# Patient Record
Sex: FEMALE | Race: WHITE | NOT HISPANIC OR LATINO | Employment: OTHER | URBAN - METROPOLITAN AREA
[De-identification: names, ages, dates, MRNs, and addresses within clinical notes are randomized per-mention and may not be internally consistent; named-entity substitution may affect disease eponyms.]

---

## 2017-01-30 ENCOUNTER — HOSPITAL ENCOUNTER (OUTPATIENT)
Dept: RADIOLOGY | Facility: CLINIC | Age: 62
Discharge: HOME | End: 2017-01-30
Admitting: EMERGENCY MEDICINE
Payer: COMMERCIAL

## 2017-01-30 ENCOUNTER — OFFICE VISIT (OUTPATIENT)
Dept: URGENT CARE | Facility: CLINIC | Age: 62
End: 2017-01-30
Payer: COMMERCIAL

## 2017-01-30 DIAGNOSIS — S93.601A SPRAIN OF RIGHT FOOT: ICD-10-CM

## 2017-01-30 PROCEDURE — 29515 APPLICATION SHORT LEG SPLINT: CPT

## 2017-01-30 PROCEDURE — 73590 X-RAY EXAM OF LOWER LEG: CPT

## 2017-01-30 PROCEDURE — 99214 OFFICE O/P EST MOD 30 MIN: CPT

## 2017-01-30 PROCEDURE — 73630 X-RAY EXAM OF FOOT: CPT

## 2018-07-20 ENCOUNTER — TRANSCRIBE ORDERS (OUTPATIENT)
Dept: ADMINISTRATIVE | Facility: HOSPITAL | Age: 63
End: 2018-07-20

## 2018-07-20 DIAGNOSIS — Z12.39 SCREENING BREAST EXAMINATION: Primary | ICD-10-CM

## 2018-08-03 ENCOUNTER — HOSPITAL ENCOUNTER (OUTPATIENT)
Dept: MAMMOGRAPHY | Facility: HOSPITAL | Age: 63
Discharge: HOME | End: 2018-08-03
Attending: FAMILY MEDICINE
Payer: COMMERCIAL

## 2018-08-03 DIAGNOSIS — Z12.39 SCREENING BREAST EXAMINATION: ICD-10-CM

## 2018-08-03 PROCEDURE — 77063 BREAST TOMOSYNTHESIS BI: CPT

## 2018-08-03 PROCEDURE — 77067 SCR MAMMO BI INCL CAD: CPT

## 2019-05-15 ENCOUNTER — TRANSCRIBE ORDERS (OUTPATIENT)
Dept: LAB | Facility: MEDICAL CENTER | Age: 64
End: 2019-05-15

## 2019-05-15 ENCOUNTER — APPOINTMENT (OUTPATIENT)
Dept: LAB | Facility: MEDICAL CENTER | Age: 64
End: 2019-05-15
Payer: MEDICARE

## 2019-05-15 DIAGNOSIS — E55.9 VITAMIN D DEFICIENCY: ICD-10-CM

## 2019-05-15 DIAGNOSIS — E74.39 GLUCOSE INTOLERANCE: ICD-10-CM

## 2019-05-15 DIAGNOSIS — M89.9 BONE DISEASE: ICD-10-CM

## 2019-05-15 DIAGNOSIS — I10 HYPERTENSION, UNSPECIFIED TYPE: Primary | ICD-10-CM

## 2019-05-15 DIAGNOSIS — I10 HYPERTENSION, UNSPECIFIED TYPE: ICD-10-CM

## 2019-05-15 LAB
25(OH)D3 SERPL-MCNC: 30 NG/ML (ref 30–100)
EST. AVERAGE GLUCOSE BLD GHB EST-MCNC: 114 MG/DL
HBA1C MFR BLD: 5.6 % (ref 4.2–6.3)
T4 FREE SERPL-MCNC: 1.09 NG/DL (ref 0.76–1.46)
TSH SERPL DL<=0.05 MIU/L-ACNC: 1.61 UIU/ML (ref 0.36–3.74)

## 2019-05-15 PROCEDURE — 84439 ASSAY OF FREE THYROXINE: CPT

## 2019-05-15 PROCEDURE — 84443 ASSAY THYROID STIM HORMONE: CPT

## 2019-05-15 PROCEDURE — 82306 VITAMIN D 25 HYDROXY: CPT

## 2019-05-15 PROCEDURE — 83036 HEMOGLOBIN GLYCOSYLATED A1C: CPT

## 2019-05-15 PROCEDURE — 36415 COLL VENOUS BLD VENIPUNCTURE: CPT

## 2019-08-13 ENCOUNTER — TRANSCRIBE ORDERS (OUTPATIENT)
Dept: ADMINISTRATIVE | Facility: HOSPITAL | Age: 64
End: 2019-08-13

## 2019-08-13 DIAGNOSIS — Z12.39 BREAST SCREENING, UNSPECIFIED: Primary | ICD-10-CM

## 2019-08-14 ENCOUNTER — TRANSCRIBE ORDERS (OUTPATIENT)
Dept: ADMINISTRATIVE | Facility: HOSPITAL | Age: 64
End: 2019-08-14

## 2019-08-14 DIAGNOSIS — Z12.31 ENCOUNTER FOR SCREENING MAMMOGRAM FOR MALIGNANT NEOPLASM OF BREAST: Primary | ICD-10-CM

## 2019-08-15 ENCOUNTER — HOSPITAL ENCOUNTER (OUTPATIENT)
Dept: MAMMOGRAPHY | Facility: HOSPITAL | Age: 64
Discharge: HOME | End: 2019-08-15
Attending: INTERNAL MEDICINE
Payer: MEDICARE

## 2019-08-15 VITALS — BODY MASS INDEX: 26.29 KG/M2 | WEIGHT: 154 LBS | HEIGHT: 64 IN

## 2019-08-15 DIAGNOSIS — Z12.31 ENCOUNTER FOR SCREENING MAMMOGRAM FOR MALIGNANT NEOPLASM OF BREAST: ICD-10-CM

## 2019-08-15 PROCEDURE — 77067 SCR MAMMO BI INCL CAD: CPT

## 2019-08-15 PROCEDURE — 77063 BREAST TOMOSYNTHESIS BI: CPT

## 2020-07-27 ENCOUNTER — OFFICE VISIT (OUTPATIENT)
Dept: URGENT CARE | Facility: CLINIC | Age: 65
End: 2020-07-27
Payer: MEDICARE

## 2020-07-27 ENCOUNTER — APPOINTMENT (OUTPATIENT)
Dept: RADIOLOGY | Facility: CLINIC | Age: 65
End: 2020-07-27
Payer: MEDICARE

## 2020-07-27 VITALS
RESPIRATION RATE: 16 BRPM | TEMPERATURE: 97.8 F | HEART RATE: 83 BPM | WEIGHT: 155.2 LBS | OXYGEN SATURATION: 98 % | HEIGHT: 63 IN | DIASTOLIC BLOOD PRESSURE: 95 MMHG | BODY MASS INDEX: 27.5 KG/M2 | SYSTOLIC BLOOD PRESSURE: 224 MMHG

## 2020-07-27 DIAGNOSIS — S89.91XA INJURY OF RIGHT KNEE, INITIAL ENCOUNTER: ICD-10-CM

## 2020-07-27 DIAGNOSIS — S89.92XA INJURY OF LEFT KNEE, INITIAL ENCOUNTER: ICD-10-CM

## 2020-07-27 DIAGNOSIS — S80.01XA CONTUSION OF RIGHT KNEE, INITIAL ENCOUNTER: ICD-10-CM

## 2020-07-27 DIAGNOSIS — S80.211A ABRASION OF RIGHT KNEE, INITIAL ENCOUNTER: Primary | ICD-10-CM

## 2020-07-27 DIAGNOSIS — S80.212A ABRASION, LEFT KNEE, INITIAL ENCOUNTER: ICD-10-CM

## 2020-07-27 PROCEDURE — G0463 HOSPITAL OUTPT CLINIC VISIT: HCPCS | Performed by: PHYSICIAN ASSISTANT

## 2020-07-27 PROCEDURE — 73564 X-RAY EXAM KNEE 4 OR MORE: CPT

## 2020-07-27 PROCEDURE — 90715 TDAP VACCINE 7 YRS/> IM: CPT

## 2020-07-27 PROCEDURE — 99213 OFFICE O/P EST LOW 20 MIN: CPT | Performed by: PHYSICIAN ASSISTANT

## 2020-07-27 PROCEDURE — 90471 IMMUNIZATION ADMIN: CPT | Performed by: PHYSICIAN ASSISTANT

## 2020-07-27 RX ORDER — RAMIPRIL 2.5 MG/1
5 CAPSULE ORAL DAILY
COMMUNITY

## 2020-07-27 RX ORDER — MULTIVITAMIN
1 TABLET ORAL DAILY
COMMUNITY

## 2020-07-27 NOTE — PROGRESS NOTES
[address]        NAME: Luiz Pantoja is a 76 y o  female  : 1955    MRN: [id number]  DATE: 2020  TIME: 3:02 PM    Assessment and Plan   Abrasion of right knee, initial encounter [S80 211A]  1  Abrasion of right knee, initial encounter  TDAP Vaccine greater than or equal to 6yo   2  Injury of right knee, initial encounter  XR knee 4+ vw right injury   3  Contusion of right knee, initial encounter     4  Abrasion, left knee, initial encounter     5  Injury of left knee, initial encounter  XR knee 4+ vw left injury         Patient Instructions     Apply Neosporin or bacitracin to the abrasions  Apply ice 20 minutes at a time every 2 hours to help with swelling  Taken anti-inflammatory of your choice but take food  If no improvement follow-up your family doctor or Orthopedics  Follow up with PCP in 3-5 days  Proceed to  ER if symptoms worsen  Chief Complaint     Chief Complaint   Patient presents with    Fall     pt fell today after tripping, now c/o BL knee pain, right worse than left  Last tetanus was 2010  History of Present Illness       Patient launch due to protect her grandson from falling, when she tripped and fell herself injuring both knees  Patient has abrasions of both knees right being worse than left she is having swelling and pain bilaterally right being worse than  Review of Systems   Review of Systems   Constitutional: Negative for chills and fever  Gastrointestinal: Negative for nausea and vomiting  Musculoskeletal: Positive for arthralgias  Skin: Positive for wound  Neurological: Negative for weakness and numbness  Hematological: Negative for adenopathy           Current Medications       Current Outpatient Medications:     Cholecalciferol (VITAMIN D3 PO), Take by mouth, Disp: , Rfl:     Multiple Vitamin (MULTIVITAMIN) tablet, Take 1 tablet by mouth daily, Disp: , Rfl:     ramipril (ALTACE) 2 5 mg capsule, Take 5 mg by mouth daily, Disp: , Rfl:     Current Allergies     Allergies as of 07/27/2020 - Reviewed 07/27/2020   Allergen Reaction Noted    Penicillins Nausea Only 08/15/2019    Sulfa antibiotics Nausea Only 08/15/2019            The following portions of the patient's history were reviewed and updated as appropriate: allergies, current medications, past family history, past medical history, past social history, past surgical history and problem list      History reviewed  No pertinent past medical history  Past Surgical History:   Procedure Laterality Date    HYSTERECTOMY  2002       Family History   Problem Relation Age of Onset    Diabetes Mother     Hypertension Father     No Known Problems Sister     No Known Problems Maternal Grandmother     No Known Problems Paternal Grandmother     No Known Problems Sister     No Known Problems Sister     No Known Problems Maternal Aunt     No Known Problems Maternal Aunt          Medications have been verified  Objective   BP (!) 224/95 (BP Location: Left arm, Patient Position: Sitting, Cuff Size: Large)   Pulse 83   Temp 97 8 °F (36 6 °C) (Temporal)   Resp 16   Ht 5' 3" (1 6 m)   Wt 70 4 kg (155 lb 3 2 oz)   SpO2 98%   BMI 27 49 kg/m²        Physical Exam     Physical Exam   Constitutional: She appears well-developed and well-nourished  HENT:   Head: Normocephalic and atraumatic  Cardiovascular: Normal rate and regular rhythm  Pulmonary/Chest: Effort normal    Musculoskeletal:   Abrasion over the right patellar tendon  There is swelling over the patellar tendon  No limitation of motion  There is tenderness to palpation of the right knee  Left knee with a small abrasion no swelling  Mild tenderness to palpation  No limitation of motion  Neurological: She is alert  Skin: Skin is warm  Psychiatric: She has a normal mood and affect  Her behavior is normal    Nursing note and vitals reviewed      Knee x-ray viewed by me and independently reviewed by me contemporaneously as no acute bony abnormality

## 2020-07-27 NOTE — PATIENT INSTRUCTIONS
Apply Neosporin or bacitracin to the abrasions  Apply ice 20 minutes at a time every 2 hours to help with swelling  Taken anti-inflammatory of your choice but take food  If no improvement follow-up your family doctor or Orthopedics  Contusion in Adults, Ambulatory Care   GENERAL INFORMATION:   A contusion  is a bruise that appears on your skin after an injury  A bruise happens when small blood vessels tear but skin does not  When blood vessels tear, blood leaks into nearby tissue, such as soft tissue or muscle  Common symptoms include the following:   · Pain that increases when you touch the bruise, walk, or use the area around the bruise    · Swelling or a lump at the site of the bruise or near it    · Red, blue, or black skin that may change to green or yellow after a few days    · Stiffness or problems moving the bruised area of your body  Seek immediate care for the following symptoms:   · New difficulty moving your injured area    · Tingling or numbness in or near the injured area    · Hand or foot below the bruise gets cold or turns pale  Treatment for a contusion  may include any of the following:  · NSAIDs  help decrease swelling and pain or fever  This medicine is available with or without a doctor's order  NSAIDs can cause stomach bleeding or kidney problems in certain people  If you take blood thinner medicine, always ask your healthcare provider if NSAIDs are safe for you  Always read the medicine label and follow directions  · Pain medicine  to decrease or take away pain  Do not wait until the pain is severe before you take your medicine  · Aspiration  to drain pooled blood in your muscle may be done to help prevent increased pressure in the muscle  · Surgery  may be done to repair a tear in the muscle or relieve pressure in the muscle caused by swelling    Care for a contusion:   · Rest the injured area  or use it less than usual  If you bruised your leg or foot, you may need crutches or a cane to help you walk  This will help you keep weight off your injured body part  Use crutches or a cane as directed  · Use ice  to decrease swelling and pain  Ice may also help prevent tissue damage  Use an ice pack, or put crushed ice in a plastic bag  Cover it with a towel and place it on your bruise for 15 to 20 minutes every hour or as directed  · Use Compression  An elastic bandage may be wrapped around a bruised muscle to support the area and decrease swelling  Make sure the bandage is not too tight  You should be able to fit 1 finger between the bandage and your skin  · Elevate (raise) your injured body part  above the level of your heart to help decrease pain and swelling  Use pillows, blankets, or rolled towels to elevate the area as often as you can  · Do not massage or use heat  Heat and massage may slow healing of the area  · Do not drink alcohol  Alcohol may slow healing of your injury  · Do not stretch injured muscles  Ask your healthcare provider when and how you may safely stretch after your injury  Prevent a contusion:   · Stretch and warm up before you play sports or exercise  · Wear protective gear when you play sports  Examples are shin guards and padding  · If you begin a new physical activity, start slowly to give your body a chance to adjust   Follow up with your healthcare provider as directed:  Write down your questions so you remember to ask them during your visits  CARE AGREEMENT:   You have the right to help plan your care  Learn about your health condition and how it may be treated  Discuss treatment options with your caregivers to decide what care you want to receive  You always have the right to refuse treatment  The above information is an  only  It is not intended as medical advice for individual conditions or treatments   Talk to your doctor, nurse or pharmacist before following any medical regimen to see if it is safe and effective for you  © 2014 [address] is for End User's use only and may not be sold, redistributed or otherwise used for commercial purposes  All illustrations and images included in CareNotes® are the copyrighted property of [employer]  or [employer]ai [employer]  Abrasion   AMBULATORY CARE:   An abrasion  is a scrape on your skin  It happens when your skin rubs against a rough surface  Some examples of an abrasion include rug burn, a skinned elbow, or road rash  Abrasions can be many shapes and sizes  The wound may hurt, bleed, bruise, or swell  Seek care immediately if:   · The bleeding does not stop after 10 minutes of firm pressure  · You cannot rinse one or more foreign objects out of your wound  · You have red streaks on your skin coming from your wound  Contact your healthcare provider if:   · You have a fever or chills  · Your abrasion is red, warm, swollen, or draining pus  · You have questions or concerns about your condition or care  Care for your abrasion:   · Wash your hands and dry them with a clean towel  · Press a clean cloth against your wound to stop any bleeding  · Rinse your wound with a lot of clean water  Do not use harsh soap, alcohol, or iodine solutions  · Use a clean, wet cloth to remove any objects, such as small pieces of rocks or dirt  · Rub antibiotic ointment on your wound  This may help prevent infection and help your wound heal     · Cover the wound with a non-stick bandage  Change the bandage daily, and if gets wet or dirty  Follow up with your healthcare provider as directed:  Write down your questions so you remember to ask them during your visits  © 2017 [address] Information is for End User's use only and may not be sold, redistributed or otherwise used for commercial purposes   All illustrations and images included in CareNotes® are the copyrighted property of [employer]  or [employer]  The above information is an  only  It is not intended as medical advice for individual conditions or treatments  Talk to your doctor, nurse or pharmacist before following any medical regimen to see if it is safe and effective for you

## 2020-08-03 ENCOUNTER — TRANSCRIBE ORDERS (OUTPATIENT)
Dept: ADMINISTRATIVE | Facility: HOSPITAL | Age: 65
End: 2020-08-03

## 2020-08-03 DIAGNOSIS — Z12.31 ENCOUNTER FOR SCREENING MAMMOGRAM FOR MALIGNANT NEOPLASM OF BREAST: Primary | ICD-10-CM

## 2020-08-17 ENCOUNTER — HOSPITAL ENCOUNTER (OUTPATIENT)
Dept: MAMMOGRAPHY | Facility: HOSPITAL | Age: 65
Discharge: HOME | End: 2020-08-17
Attending: FAMILY MEDICINE
Payer: MEDICARE

## 2020-08-17 VITALS — HEIGHT: 63 IN | BODY MASS INDEX: 27.29 KG/M2 | WEIGHT: 154 LBS

## 2020-08-17 DIAGNOSIS — Z12.31 ENCOUNTER FOR SCREENING MAMMOGRAM FOR MALIGNANT NEOPLASM OF BREAST: ICD-10-CM

## 2020-08-17 PROCEDURE — 77067 SCR MAMMO BI INCL CAD: CPT

## 2020-08-17 PROCEDURE — 77063 BREAST TOMOSYNTHESIS BI: CPT

## 2021-05-22 ENCOUNTER — HOSPITAL ENCOUNTER (EMERGENCY)
Dept: HOSPITAL 53 - M ED | Age: 66
LOS: 1 days | Discharge: HOME | End: 2021-05-23
Payer: MEDICARE

## 2021-05-22 VITALS — HEIGHT: 63 IN | WEIGHT: 159.39 LBS | BODY MASS INDEX: 28.24 KG/M2

## 2021-05-22 VITALS — SYSTOLIC BLOOD PRESSURE: 213 MMHG | DIASTOLIC BLOOD PRESSURE: 99 MMHG

## 2021-05-22 DIAGNOSIS — I10: ICD-10-CM

## 2021-05-22 DIAGNOSIS — M54.30: Primary | ICD-10-CM

## 2021-05-22 DIAGNOSIS — Z88.2: ICD-10-CM

## 2021-05-22 DIAGNOSIS — Z88.0: ICD-10-CM

## 2021-05-22 DIAGNOSIS — Z88.1: ICD-10-CM

## 2021-05-22 PROCEDURE — 96372 THER/PROPH/DIAG INJ SC/IM: CPT

## 2021-05-22 PROCEDURE — 99284 EMERGENCY DEPT VISIT MOD MDM: CPT

## 2021-05-23 VITALS — DIASTOLIC BLOOD PRESSURE: 88 MMHG | SYSTOLIC BLOOD PRESSURE: 191 MMHG

## 2021-05-24 ENCOUNTER — HOSPITAL ENCOUNTER (EMERGENCY)
Dept: HOSPITAL 53 - M ED | Age: 66
Discharge: HOME | End: 2021-05-24
Payer: MEDICARE

## 2021-05-24 VITALS — BODY MASS INDEX: 28.05 KG/M2 | HEIGHT: 63 IN | WEIGHT: 158.34 LBS

## 2021-05-24 VITALS — SYSTOLIC BLOOD PRESSURE: 182 MMHG | DIASTOLIC BLOOD PRESSURE: 95 MMHG

## 2021-05-24 DIAGNOSIS — I10: Primary | ICD-10-CM

## 2021-05-24 DIAGNOSIS — Z88.1: ICD-10-CM

## 2021-05-24 DIAGNOSIS — Z88.2: ICD-10-CM

## 2021-05-24 DIAGNOSIS — M54.30: ICD-10-CM

## 2021-05-24 DIAGNOSIS — Z79.899: ICD-10-CM

## 2021-05-24 DIAGNOSIS — Z86.79: ICD-10-CM

## 2021-05-24 DIAGNOSIS — Z88.0: ICD-10-CM

## 2021-05-24 DIAGNOSIS — R79.89: ICD-10-CM

## 2021-05-24 LAB
BASOPHILS # BLD AUTO: 0 10^3/UL (ref 0–0.2)
BASOPHILS NFR BLD AUTO: 0.4 % (ref 0–1)
CK MB CFR.DF SERPL CALC: 1.92
CK MB SERPL-MCNC: < 1 NG/ML (ref ?–3.6)
CK SERPL-CCNC: 52 U/L (ref 26–192)
EOSINOPHIL # BLD AUTO: 0.1 10^3/UL (ref 0–0.5)
EOSINOPHIL NFR BLD AUTO: 1.1 % (ref 0–3)
HCT VFR BLD AUTO: 47.8 % (ref 36–47)
HGB BLD-MCNC: 16.1 G/DL (ref 12–15.5)
LYMPHOCYTES # BLD AUTO: 2.3 10^3/UL (ref 1.5–5)
LYMPHOCYTES NFR BLD AUTO: 27.2 % (ref 24–44)
MCH RBC QN AUTO: 29.2 PG (ref 27–33)
MCHC RBC AUTO-ENTMCNC: 33.7 G/DL (ref 32–36.5)
MCV RBC AUTO: 86.8 FL (ref 80–96)
MONOCYTES # BLD AUTO: 0.5 10^3/UL (ref 0–0.8)
MONOCYTES NFR BLD AUTO: 6.2 % (ref 2–8)
NEUTROPHILS # BLD AUTO: 5.6 10^3/UL (ref 1.5–8.5)
NEUTROPHILS NFR BLD AUTO: 64.9 % (ref 36–66)
PLATELET # BLD AUTO: 248 10^3/UL (ref 150–450)
RBC # BLD AUTO: 5.51 10^6/UL (ref 4–5.4)
TROPONIN I SERPL-MCNC: < 0.02 NG/ML (ref ?–0.1)
WBC # BLD AUTO: 8.6 10^3/UL (ref 4–10)

## 2021-06-14 ENCOUNTER — HOSPITAL ENCOUNTER (OUTPATIENT)
Dept: HOSPITAL 53 - M LAB | Age: 66
End: 2021-06-14
Attending: INTERNAL MEDICINE
Payer: MEDICARE

## 2021-06-14 DIAGNOSIS — I10: Primary | ICD-10-CM

## 2021-06-14 LAB
ALBUMIN SERPL BCG-MCNC: 3.8 GM/DL (ref 3.2–5.2)
BUN SERPL-MCNC: 22 MG/DL (ref 7–18)
CALCIUM SERPL-MCNC: 9 MG/DL (ref 8.8–10.2)
CHLORIDE SERPL-SCNC: 101 MEQ/L (ref 98–107)
CHOLEST SERPL-MCNC: 219 MG/DL (ref ?–200)
CHOLEST/HDLC SERPL: 5.47 {RATIO} (ref ?–5)
CO2 SERPL-SCNC: 32 MEQ/L (ref 21–32)
CREAT SERPL-MCNC: 0.73 MG/DL (ref 0.55–1.3)
CREAT UR-MCNC: 87 MG/DL
GFR SERPL CREATININE-BSD FRML MDRD: > 60 ML/MIN/{1.73_M2} (ref 45–?)
GLUCOSE SERPL-MCNC: 107 MG/DL (ref 70–100)
HDLC SERPL-MCNC: 40 MG/DL (ref 40–?)
LDLC SERPL CALC-MCNC: 153 MG/DL (ref ?–100)
MICROALBUMIN UR-MCNC: 17.2 MG/L
MICROALBUMIN/CREAT UR: 19.7 MCG/MG (ref 0–30)
NONHDLC SERPL-MCNC: 179 MG/DL
PHOSPHATE SERPL-MCNC: 3.5 MG/DL (ref 2.5–4.9)
POTASSIUM SERPL-SCNC: 3.9 MEQ/L (ref 3.5–5.1)
SODIUM SERPL-SCNC: 137 MEQ/L (ref 136–145)
T4 FREE SERPL-MCNC: 1.28 NG/DL (ref 0.76–1.46)
TRIGL SERPL-MCNC: 130 MG/DL (ref ?–150)
TSH SERPL DL<=0.005 MIU/L-ACNC: 1.75 UIU/ML (ref 0.36–3.74)

## 2021-06-15 ENCOUNTER — HOSPITAL ENCOUNTER (OUTPATIENT)
Dept: HOSPITAL 53 - M LAB | Age: 66
End: 2021-06-15
Attending: INTERNAL MEDICINE
Payer: MEDICARE

## 2021-06-15 DIAGNOSIS — I10: Primary | ICD-10-CM

## 2021-06-17 LAB
METANEPH FREE SERPL-MCNC: 35.7 PG/ML (ref 0–88)
NORMETANEPHRINE SERPL-MCNC: 137.9 PG/ML (ref 0–191.8)

## 2021-06-23 ENCOUNTER — HOSPITAL ENCOUNTER (OUTPATIENT)
Dept: HOSPITAL 53 - M RAD | Age: 66
End: 2021-06-23
Attending: INTERNAL MEDICINE
Payer: MEDICARE

## 2021-06-23 DIAGNOSIS — I10: Primary | ICD-10-CM

## 2021-06-26 ENCOUNTER — HOSPITAL ENCOUNTER (OUTPATIENT)
Dept: HOSPITAL 53 - M RAD | Age: 66
End: 2021-06-26
Attending: PHYSICIAN ASSISTANT
Payer: MEDICARE

## 2021-06-26 DIAGNOSIS — M51.36: Primary | ICD-10-CM

## 2021-06-28 ENCOUNTER — HOSPITAL ENCOUNTER (OUTPATIENT)
Dept: HOSPITAL 53 - M LAB | Age: 66
End: 2021-06-28
Attending: INTERNAL MEDICINE
Payer: MEDICARE

## 2021-06-28 DIAGNOSIS — I10: ICD-10-CM

## 2021-06-28 DIAGNOSIS — E24.9: Primary | ICD-10-CM

## 2021-06-30 ENCOUNTER — HOSPITAL ENCOUNTER (OUTPATIENT)
Dept: HOSPITAL 53 - M LAB | Age: 66
End: 2021-06-30
Attending: INTERNAL MEDICINE
Payer: MEDICARE

## 2021-06-30 DIAGNOSIS — Z79.899: ICD-10-CM

## 2021-06-30 DIAGNOSIS — I10: Primary | ICD-10-CM

## 2021-06-30 LAB
BUN SERPL-MCNC: 19 MG/DL (ref 7–18)
CALCIUM SERPL-MCNC: 8.8 MG/DL (ref 8.8–10.2)
CHLORIDE SERPL-SCNC: 102 MEQ/L (ref 98–107)
CO2 SERPL-SCNC: 29 MEQ/L (ref 21–32)
CREAT SERPL-MCNC: 0.74 MG/DL (ref 0.55–1.3)
EST. AVERAGE GLUCOSE BLD GHB EST-MCNC: 117 MG/DL (ref 60–110)
GFR SERPL CREATININE-BSD FRML MDRD: > 60 ML/MIN/{1.73_M2} (ref 45–?)
GLUCOSE SERPL-MCNC: 103 MG/DL (ref 70–100)
POTASSIUM SERPL-SCNC: 3.8 MEQ/L (ref 3.5–5.1)
SODIUM SERPL-SCNC: 137 MEQ/L (ref 136–145)

## 2021-11-23 ENCOUNTER — HOSPITAL ENCOUNTER (OUTPATIENT)
Dept: HOSPITAL 53 - M PLALAB | Age: 66
End: 2021-11-23
Attending: NURSE PRACTITIONER
Payer: MEDICARE

## 2021-11-23 DIAGNOSIS — I10: Primary | ICD-10-CM

## 2021-11-23 DIAGNOSIS — Z79.899: ICD-10-CM

## 2021-11-23 LAB
25(OH)D3 SERPL-MCNC: 54.8 NG/ML (ref 30–100)
ALBUMIN SERPL BCG-MCNC: 3.7 GM/DL (ref 3.2–5.2)
ALT SERPL W P-5'-P-CCNC: 33 U/L (ref 12–78)
BILIRUB SERPL-MCNC: 1 MG/DL (ref 0.2–1)
BUN SERPL-MCNC: 18 MG/DL (ref 7–18)
CALCIUM SERPL-MCNC: 9.5 MG/DL (ref 8.8–10.2)
CHLORIDE SERPL-SCNC: 102 MEQ/L (ref 98–107)
CHOLEST SERPL-MCNC: 167 MG/DL (ref ?–200)
CHOLEST/HDLC SERPL: 4.28 {RATIO} (ref ?–5)
CO2 SERPL-SCNC: 32 MEQ/L (ref 21–32)
CREAT SERPL-MCNC: 0.82 MG/DL (ref 0.55–1.3)
CREAT UR-MCNC: 232 MG/DL
EST. AVERAGE GLUCOSE BLD GHB EST-MCNC: 126 MG/DL (ref 60–110)
GFR SERPL CREATININE-BSD FRML MDRD: > 60 ML/MIN/{1.73_M2} (ref 45–?)
GLUCOSE SERPL-MCNC: 117 MG/DL (ref 70–100)
HDLC SERPL-MCNC: 39 MG/DL (ref 40–?)
LDLC SERPL CALC-MCNC: 95 MG/DL (ref ?–100)
MICROALBUMIN UR-MCNC: 37 MG/L
MICROALBUMIN/CREAT UR: 15.9 MCG/MG (ref 0–30)
NONHDLC SERPL-MCNC: 128 MG/DL
POTASSIUM SERPL-SCNC: 3.9 MEQ/L (ref 3.5–5.1)
PROT SERPL-MCNC: 7.1 GM/DL (ref 6.4–8.2)
SODIUM SERPL-SCNC: 139 MEQ/L (ref 136–145)
TRIGL SERPL-MCNC: 167 MG/DL (ref ?–150)

## 2022-04-21 ENCOUNTER — HOSPITAL ENCOUNTER (OUTPATIENT)
Dept: HOSPITAL 53 - M PLALAB | Age: 67
End: 2022-04-21
Attending: PHYSICIAN ASSISTANT
Payer: MEDICARE

## 2022-04-21 DIAGNOSIS — Z79.899: ICD-10-CM

## 2022-04-21 DIAGNOSIS — R73.03: Primary | ICD-10-CM

## 2022-04-21 DIAGNOSIS — M25.541: ICD-10-CM

## 2022-04-21 DIAGNOSIS — E55.9: ICD-10-CM

## 2022-04-21 DIAGNOSIS — E78.2: ICD-10-CM

## 2022-04-21 DIAGNOSIS — I10: ICD-10-CM

## 2022-04-21 DIAGNOSIS — M25.542: ICD-10-CM

## 2022-04-21 LAB
25(OH)D3 SERPL-MCNC: 53.4 NG/ML (ref 30–100)
ALBUMIN SERPL BCG-MCNC: 3.9 GM/DL (ref 3.2–5.2)
ALT SERPL W P-5'-P-CCNC: 37 U/L (ref 12–78)
BILIRUB SERPL-MCNC: 1.2 MG/DL (ref 0.2–1)
BUN SERPL-MCNC: 19 MG/DL (ref 7–18)
CALCIUM SERPL-MCNC: 9.1 MG/DL (ref 8.8–10.2)
CHLORIDE SERPL-SCNC: 103 MEQ/L (ref 98–107)
CHOLEST SERPL-MCNC: 158 MG/DL (ref ?–200)
CHOLEST/HDLC SERPL: 4.79 {RATIO} (ref ?–5)
CO2 SERPL-SCNC: 29 MEQ/L (ref 21–32)
CREAT SERPL-MCNC: 0.64 MG/DL (ref 0.55–1.3)
CRP SERPL-MCNC: 1.24 MG/DL (ref 0–0.3)
EOSINOPHIL NFR BLD MANUAL: 2 % (ref 0–3)
ERYTHROCYTE [SEDIMENTATION RATE] IN BLOOD BY WESTERGREN METHOD: 3 MM/HR (ref 0–30)
EST. AVERAGE GLUCOSE BLD GHB EST-MCNC: 126 MG/DL (ref 60–110)
GFR SERPL CREATININE-BSD FRML MDRD: > 60 ML/MIN/{1.73_M2} (ref 45–?)
GLUCOSE SERPL-MCNC: 124 MG/DL (ref 70–100)
HCT VFR BLD AUTO: 42.3 % (ref 36–47)
HDLC SERPL-MCNC: 33 MG/DL (ref 40–?)
HGB BLD-MCNC: 14.3 G/DL (ref 12–15.5)
LDLC SERPL CALC-MCNC: 103 MG/DL (ref ?–100)
LYMPHOCYTES NFR BLD MANUAL: 38 % (ref 16–44)
MCH RBC QN AUTO: 29.8 PG (ref 27–33)
MCHC RBC AUTO-ENTMCNC: 33.8 G/DL (ref 32–36.5)
MCV RBC AUTO: 88.1 FL (ref 80–96)
MONOCYTES NFR BLD MANUAL: 5 % (ref 0–5)
NEUTROPHILS NFR BLD MANUAL: 53 % (ref 28–66)
NONHDLC SERPL-MCNC: 125 MG/DL
OVALOCYTES BLD QL SMEAR: (no result)
PLATELET # BLD AUTO: 238 10^3/UL (ref 150–450)
PLATELET BLD QL SMEAR: NORMAL
POTASSIUM SERPL-SCNC: 4 MEQ/L (ref 3.5–5.1)
PROT SERPL-MCNC: 7.6 GM/DL (ref 6.4–8.2)
RBC # BLD AUTO: 4.8 10^6/UL (ref 4–5.4)
RHEUMATOID FACT SERPL-ACNC: < 10 IU/ML (ref ?–15)
SODIUM SERPL-SCNC: 142 MEQ/L (ref 136–145)
TRIGL SERPL-MCNC: 112 MG/DL (ref ?–150)
VARIANT LYMPHS NFR BLD MANUAL: 2 % (ref 0–5)
WBC # BLD AUTO: 4.7 10^3/UL (ref 4–10)

## 2022-04-23 LAB — CCP IGG SERPL-ACNC: 7 UNITS (ref 0–19)

## 2022-07-13 ENCOUNTER — HOSPITAL ENCOUNTER (OUTPATIENT)
Dept: HOSPITAL 53 - M WHC | Age: 67
End: 2022-07-13
Attending: PHYSICIAN ASSISTANT
Payer: MEDICARE

## 2022-07-13 DIAGNOSIS — Z12.31: Primary | ICD-10-CM

## 2022-10-05 ENCOUNTER — HOSPITAL ENCOUNTER (OUTPATIENT)
Dept: HOSPITAL 53 - M WHC | Age: 67
End: 2022-10-05
Attending: PHYSICIAN ASSISTANT
Payer: MEDICARE

## 2022-10-05 DIAGNOSIS — M81.0: Primary | ICD-10-CM

## 2023-03-07 ENCOUNTER — HOSPITAL ENCOUNTER (OUTPATIENT)
Dept: HOSPITAL 53 - M PLALAB | Age: 68
End: 2023-03-07
Attending: PHYSICIAN ASSISTANT
Payer: MEDICARE

## 2023-03-07 DIAGNOSIS — E78.00: ICD-10-CM

## 2023-03-07 DIAGNOSIS — R73.03: Primary | ICD-10-CM

## 2023-03-07 LAB
25(OH)D3 SERPL-MCNC: 54 NG/ML (ref 20–100)
ALBUMIN SERPL BCG-MCNC: 3.8 G/DL (ref 3.2–5.2)
ALP SERPL-CCNC: 53 U/L (ref 46–116)
ALT SERPL W P-5'-P-CCNC: 42 U/L (ref 7–40)
AST SERPL-CCNC: 27 U/L (ref ?–34)
BASOPHILS # BLD AUTO: 0 10^3/UL (ref 0–0.2)
BASOPHILS NFR BLD AUTO: 0.7 % (ref 0–1)
BILIRUB CONJ SERPL-MCNC: 0.4 MG/DL (ref ?–0.4)
BILIRUB SERPL-MCNC: 1.5 MG/DL (ref 0.3–1.2)
BUN SERPL-MCNC: 16 MG/DL (ref 9–23)
CALCIUM SERPL-MCNC: 9.1 MG/DL (ref 8.3–10.6)
CHLORIDE SERPL-SCNC: 101 MMOL/L (ref 98–107)
CHOLEST SERPL-MCNC: 149 MG/DL (ref ?–200)
CHOLEST/HDLC SERPL: 4.23 {RATIO} (ref ?–5)
CO2 SERPL-SCNC: 31 MMOL/L (ref 20–31)
CREAT SERPL-MCNC: 0.7 MG/DL (ref 0.55–1.3)
EOSINOPHIL # BLD AUTO: 0.1 10^3/UL (ref 0–0.5)
EOSINOPHIL NFR BLD AUTO: 2.2 % (ref 0–3)
EST. AVERAGE GLUCOSE BLD GHB EST-MCNC: 131 MG/DL (ref 60–110)
GFR SERPL CREATININE-BSD FRML MDRD: > 60 ML/MIN/{1.73_M2} (ref 45–?)
GLUCOSE SERPL-MCNC: 127 MG/DL (ref 74–106)
HBV SURFACE AB SER QL: NEGATIVE
HBV SURFACE AB SER-ACNC: NEGATIVE M[IU]/ML
HCT VFR BLD AUTO: 43.5 % (ref 36–47)
HCV AB SER QL: 0 INDEX (ref ?–0.8)
HDLC SERPL-MCNC: 35.2 MG/DL (ref 40–?)
HGB BLD-MCNC: 14.9 G/DL (ref 12–15.5)
LDLC SERPL CALC-MCNC: 89.4 MG/DL (ref ?–100)
LYMPHOCYTES # BLD AUTO: 1.7 10^3/UL (ref 1.5–5)
LYMPHOCYTES NFR BLD AUTO: 28.9 % (ref 24–44)
MCH RBC QN AUTO: 29.6 PG (ref 27–33)
MCHC RBC AUTO-ENTMCNC: 34.3 G/DL (ref 32–36.5)
MCV RBC AUTO: 86.5 FL (ref 80–96)
MONOCYTES # BLD AUTO: 0.4 10^3/UL (ref 0–0.8)
MONOCYTES NFR BLD AUTO: 6.4 % (ref 2–8)
NEUTROPHILS # BLD AUTO: 3.6 10^3/UL (ref 1.5–8.5)
NEUTROPHILS NFR BLD AUTO: 61.5 % (ref 36–66)
NONHDLC SERPL-MCNC: 114 MG/DL
PLATELET # BLD AUTO: 270 10^3/UL (ref 150–450)
POTASSIUM SERPL-SCNC: 4.1 MMOL/L (ref 3.5–5.1)
PROT SERPL-MCNC: 7.2 G/DL (ref 5.7–8.2)
PTH-INTACT SERPL-MCNC: 49.8 PG/ML (ref 18.5–88)
RBC # BLD AUTO: 5.03 10^6/UL (ref 4–5.4)
SODIUM SERPL-SCNC: 139 MMOL/L (ref 136–145)
TRIGL SERPL-MCNC: 122 MG/DL (ref ?–150)
WBC # BLD AUTO: 5.8 10^3/UL (ref 4–10)

## 2023-03-16 ENCOUNTER — HOSPITAL ENCOUNTER (OUTPATIENT)
Dept: HOSPITAL 53 - M SFHCPLAZ | Age: 68
End: 2023-03-16
Attending: PHYSICIAN ASSISTANT
Payer: MEDICARE

## 2023-03-16 DIAGNOSIS — R79.89: Primary | ICD-10-CM

## 2023-03-20 LAB — AFP SERPL-MCNC: 4.6 NG/ML (ref 0–9.2)

## 2024-03-14 ENCOUNTER — HOSPITAL ENCOUNTER (OUTPATIENT)
Dept: HOSPITAL 53 - M PLALAB | Age: 69
End: 2024-03-14
Attending: PHYSICIAN ASSISTANT
Payer: MEDICARE

## 2024-03-14 DIAGNOSIS — R73.03: ICD-10-CM

## 2024-03-14 DIAGNOSIS — E78.2: ICD-10-CM

## 2024-03-14 DIAGNOSIS — I10: Primary | ICD-10-CM

## 2024-03-14 DIAGNOSIS — E55.9: ICD-10-CM

## 2024-03-14 LAB
25(OH)D3 SERPL-MCNC: 55.8 NG/ML (ref 20–100)
ALBUMIN SERPL BCG-MCNC: 3.8 G/DL (ref 3.2–5.2)
ALP SERPL-CCNC: 55 U/L (ref 46–116)
ALT SERPL W P-5'-P-CCNC: 33 U/L (ref 7–40)
AST SERPL-CCNC: 25 U/L (ref ?–34)
BASOPHILS # BLD AUTO: 0 10^3/UL (ref 0–0.2)
BASOPHILS NFR BLD AUTO: 0.5 % (ref 0–1)
BILIRUB SERPL-MCNC: 1.7 MG/DL (ref 0.3–1.2)
BUN SERPL-MCNC: 18 MG/DL (ref 9–23)
CALCIUM SERPL-MCNC: 9 MG/DL (ref 8.3–10.6)
CHLORIDE SERPL-SCNC: 100 MMOL/L (ref 98–107)
CHOLEST SERPL-MCNC: 139 MG/DL (ref ?–200)
CHOLEST/HDLC SERPL: 4.07 {RATIO} (ref ?–5)
CO2 SERPL-SCNC: 33 MMOL/L (ref 20–31)
CREAT SERPL-MCNC: 0.77 MG/DL (ref 0.55–1.3)
EOSINOPHIL # BLD AUTO: 0.2 10^3/UL (ref 0–0.5)
EOSINOPHIL NFR BLD AUTO: 2.9 % (ref 0–3)
EST. AVERAGE GLUCOSE BLD GHB EST-MCNC: 134 MG/DL (ref 60–110)
GFR SERPL CREATININE-BSD FRML MDRD: > 60 ML/MIN/{1.73_M2} (ref 39–?)
GLUCOSE SERPL-MCNC: 118 MG/DL (ref 74–106)
HCT VFR BLD AUTO: 42.2 % (ref 36–47)
HDLC SERPL-MCNC: 34.1 MG/DL (ref 40–?)
HGB BLD-MCNC: 14.6 G/DL (ref 12–15.5)
LDLC SERPL CALC-MCNC: 79.5 MG/DL (ref ?–100)
LYMPHOCYTES # BLD AUTO: 1.9 10^3/UL (ref 1.5–5)
LYMPHOCYTES NFR BLD AUTO: 32.7 % (ref 24–44)
MCH RBC QN AUTO: 29.4 PG (ref 27–33)
MCHC RBC AUTO-ENTMCNC: 34.6 G/DL (ref 32–36.5)
MCV RBC AUTO: 84.9 FL (ref 80–96)
MONOCYTES # BLD AUTO: 0.4 10^3/UL (ref 0–0.8)
MONOCYTES NFR BLD AUTO: 6.4 % (ref 2–8)
NEUTROPHILS # BLD AUTO: 3.4 10^3/UL (ref 1.5–8.5)
NEUTROPHILS NFR BLD AUTO: 57.2 % (ref 36–66)
NONHDLC SERPL-MCNC: 104.9 MG/DL
PLATELET # BLD AUTO: 263 10^3/UL (ref 150–450)
POTASSIUM SERPL-SCNC: 3.9 MMOL/L (ref 3.5–5.1)
PROT SERPL-MCNC: 7.3 G/DL (ref 5.7–8.2)
PTH-INTACT SERPL-MCNC: 42.7 PG/ML (ref 18.5–88)
RBC # BLD AUTO: 4.97 10^6/UL (ref 4–5.4)
SODIUM SERPL-SCNC: 137 MMOL/L (ref 136–145)
TRIGL SERPL-MCNC: 127 MG/DL (ref ?–150)
WBC # BLD AUTO: 5.9 10^3/UL (ref 4–10)

## 2024-05-08 ENCOUNTER — HOSPITAL ENCOUNTER (OUTPATIENT)
Dept: HOSPITAL 53 - M SFHCPLAZ | Age: 69
End: 2024-05-08
Attending: PHYSICIAN ASSISTANT
Payer: MEDICARE

## 2024-05-08 DIAGNOSIS — L82.1: Primary | ICD-10-CM

## 2024-07-23 ENCOUNTER — HOSPITAL ENCOUNTER (OUTPATIENT)
Dept: HOSPITAL 53 - M WHC | Age: 69
End: 2024-07-23
Attending: PHYSICIAN ASSISTANT
Payer: MEDICARE

## 2024-07-23 DIAGNOSIS — Z12.31: Primary | ICD-10-CM

## 2024-08-12 ENCOUNTER — HOSPITAL ENCOUNTER (OUTPATIENT)
Dept: HOSPITAL 53 - M WHC | Age: 69
End: 2024-08-12
Attending: PHYSICIAN ASSISTANT
Payer: MEDICARE

## 2024-08-12 DIAGNOSIS — R92.2: Primary | ICD-10-CM

## 2024-08-12 PROCEDURE — 77065 DX MAMMO INCL CAD UNI: CPT

## 2024-09-17 ENCOUNTER — HOSPITAL ENCOUNTER (OUTPATIENT)
Dept: HOSPITAL 53 - M PLALAB | Age: 69
End: 2024-09-17
Attending: PHYSICIAN ASSISTANT
Payer: MEDICARE

## 2024-09-17 DIAGNOSIS — R73.03: Primary | ICD-10-CM

## 2024-09-17 DIAGNOSIS — I10: ICD-10-CM

## 2024-09-17 LAB
ALBUMIN SERPL BCG-MCNC: 3.9 G/DL (ref 3.2–5.2)
ALP SERPL-CCNC: 74 U/L (ref 46–116)
ALT SERPL W P-5'-P-CCNC: 40 U/L (ref 7–40)
AST SERPL-CCNC: 27 U/L (ref ?–34)
BILIRUB SERPL-MCNC: 1.9 MG/DL (ref 0.3–1.2)
BUN SERPL-MCNC: 15 MG/DL (ref 9–23)
CALCIUM SERPL-MCNC: 9.4 MG/DL (ref 8.3–10.6)
CHLORIDE SERPL-SCNC: 101 MMOL/L (ref 98–107)
CO2 SERPL-SCNC: 32 MMOL/L (ref 20–31)
CREAT SERPL-MCNC: 0.75 MG/DL (ref 0.55–1.3)
EST. AVERAGE GLUCOSE BLD GHB EST-MCNC: 131 MG/DL (ref 60–110)
GFR SERPL CREATININE-BSD FRML MDRD: > 60 ML/MIN/{1.73_M2} (ref 39–?)
GLUCOSE SERPL-MCNC: 139 MG/DL (ref 74–106)
POTASSIUM SERPL-SCNC: 4.1 MMOL/L (ref 3.5–5.1)
PROT SERPL-MCNC: 7.5 G/DL (ref 5.7–8.2)
SODIUM SERPL-SCNC: 138 MMOL/L (ref 136–145)

## 2024-10-07 ENCOUNTER — HOSPITAL ENCOUNTER (OUTPATIENT)
Dept: HOSPITAL 53 - M WHC | Age: 69
End: 2024-10-07
Attending: PHYSICIAN ASSISTANT
Payer: MEDICARE

## 2024-10-07 DIAGNOSIS — M81.0: Primary | ICD-10-CM

## 2025-03-05 ENCOUNTER — HOSPITAL ENCOUNTER (OUTPATIENT)
Dept: HOSPITAL 53 - M OPP | Age: 70
Discharge: HOME | End: 2025-03-05
Attending: SURGERY
Payer: MEDICARE

## 2025-03-05 VITALS — HEIGHT: 64 IN | WEIGHT: 164 LBS | BODY MASS INDEX: 28 KG/M2

## 2025-03-05 VITALS — TEMPERATURE: 97.9 F

## 2025-03-05 VITALS — OXYGEN SATURATION: 99 % | DIASTOLIC BLOOD PRESSURE: 69 MMHG | SYSTOLIC BLOOD PRESSURE: 135 MMHG

## 2025-03-05 DIAGNOSIS — J44.9: ICD-10-CM

## 2025-03-05 DIAGNOSIS — Z88.0: ICD-10-CM

## 2025-03-05 DIAGNOSIS — K57.30: ICD-10-CM

## 2025-03-05 DIAGNOSIS — I10: ICD-10-CM

## 2025-03-05 DIAGNOSIS — E78.00: ICD-10-CM

## 2025-03-05 DIAGNOSIS — Z86.0100: ICD-10-CM

## 2025-03-05 DIAGNOSIS — M79.7: ICD-10-CM

## 2025-03-05 DIAGNOSIS — Z79.899: ICD-10-CM

## 2025-03-05 DIAGNOSIS — K63.5: Primary | ICD-10-CM

## 2025-03-05 DIAGNOSIS — M19.90: ICD-10-CM

## 2025-03-05 DIAGNOSIS — Z91.013: ICD-10-CM

## 2025-03-05 DIAGNOSIS — Z88.2: ICD-10-CM

## 2025-03-05 DIAGNOSIS — G43.909: ICD-10-CM

## 2025-03-05 DIAGNOSIS — Z91.018: ICD-10-CM

## 2025-03-18 ENCOUNTER — HOSPITAL ENCOUNTER (OUTPATIENT)
Dept: HOSPITAL 53 - M PLALAB | Age: 70
End: 2025-03-18
Attending: PHYSICIAN ASSISTANT
Payer: MEDICARE

## 2025-03-18 DIAGNOSIS — R73.03: ICD-10-CM

## 2025-03-18 DIAGNOSIS — J30.2: ICD-10-CM

## 2025-03-18 DIAGNOSIS — E78.2: ICD-10-CM

## 2025-03-18 DIAGNOSIS — E55.9: ICD-10-CM

## 2025-03-18 DIAGNOSIS — I10: Primary | ICD-10-CM

## 2025-03-18 LAB
25(OH)D3 SERPL-MCNC: 53.9 NG/ML (ref 20–100)
ALBUMIN SERPL BCG-MCNC: 3.9 G/DL (ref 3.2–5.2)
ALP SERPL-CCNC: 63 U/L (ref 35–104)
ALT SERPL W P-5'-P-CCNC: 29 U/L (ref 7–40)
AST SERPL-CCNC: 20 U/L (ref ?–34)
BASOPHILS # BLD AUTO: 0 10^3/UL (ref 0–0.2)
BASOPHILS NFR BLD AUTO: 0.6 % (ref 0–1)
BILIRUB SERPL-MCNC: 1.6 MG/DL (ref 0.3–1.2)
BUN SERPL-MCNC: 22 MG/DL (ref 9–23)
CALCIUM SERPL-MCNC: 9.6 MG/DL (ref 8.3–10.6)
CHLORIDE SERPL-SCNC: 101 MMOL/L (ref 98–107)
CHOLEST SERPL-MCNC: 155 MG/DL (ref ?–200)
CHOLEST/HDLC SERPL: 3.85 {RATIO} (ref ?–5)
CO2 SERPL-SCNC: 32 MMOL/L (ref 20–31)
CREAT SERPL-MCNC: 0.76 MG/DL (ref 0.55–1.3)
EOSINOPHIL # BLD AUTO: 0.1 10^3/UL (ref 0–0.5)
EOSINOPHIL NFR BLD AUTO: 1.9 % (ref 0–3)
EST. AVERAGE GLUCOSE BLD GHB EST-MCNC: 128 MG/DL (ref 60–110)
GFR SERPL CREATININE-BSD FRML MDRD: > 60 ML/MIN/{1.73_M2} (ref 39–?)
GLUCOSE SERPL-MCNC: 122 MG/DL (ref 74–106)
HCT VFR BLD AUTO: 44.6 % (ref 36–47)
HDLC SERPL-MCNC: 40.2 MG/DL (ref 40–?)
HGB BLD-MCNC: 15 G/DL (ref 12–15.5)
LDLC SERPL CALC-MCNC: 91.4 MG/DL (ref ?–100)
LYMPHOCYTES # BLD AUTO: 1.8 10^3/UL (ref 1.5–5)
LYMPHOCYTES NFR BLD AUTO: 25.8 % (ref 24–44)
MCH RBC QN AUTO: 29.1 PG (ref 27–33)
MCHC RBC AUTO-ENTMCNC: 33.6 G/DL (ref 32–36.5)
MCV RBC AUTO: 86.6 FL (ref 80–96)
MONOCYTES # BLD AUTO: 0.4 10^3/UL (ref 0–0.8)
MONOCYTES NFR BLD AUTO: 6.2 % (ref 2–8)
NEUTROPHILS # BLD AUTO: 4.5 10^3/UL (ref 1.5–8.5)
NEUTROPHILS NFR BLD AUTO: 65.2 % (ref 36–66)
NONHDLC SERPL-MCNC: 114.8 MG/DL
PLATELET # BLD AUTO: 280 10^3/UL (ref 150–450)
POTASSIUM SERPL-SCNC: 4.3 MMOL/L (ref 3.5–5.1)
PROT SERPL-MCNC: 7.6 G/DL (ref 5.7–8.2)
PTH-INTACT SERPL-MCNC: 42.8 PG/ML (ref 18.5–88)
RBC # BLD AUTO: 5.15 10^6/UL (ref 4–5.4)
SODIUM SERPL-SCNC: 140 MMOL/L (ref 136–145)
TRIGL SERPL-MCNC: 117 MG/DL (ref ?–150)
WBC # BLD AUTO: 6.9 10^3/UL (ref 4–10)